# Patient Record
Sex: FEMALE | Race: WHITE | NOT HISPANIC OR LATINO | URBAN - METROPOLITAN AREA
[De-identification: names, ages, dates, MRNs, and addresses within clinical notes are randomized per-mention and may not be internally consistent; named-entity substitution may affect disease eponyms.]

---

## 2018-06-18 ENCOUNTER — TESTING ONLY (OUTPATIENT)
Dept: URBAN - METROPOLITAN AREA CLINIC 45 | Facility: CLINIC | Age: 58
End: 2018-06-18
Payer: COMMERCIAL

## 2018-06-18 PROCEDURE — 92083 EXTENDED VISUAL FIELD XM: CPT | Performed by: OPHTHALMOLOGY

## 2018-06-19 ENCOUNTER — OFFICE VISIT (OUTPATIENT)
Dept: URBAN - METROPOLITAN AREA CLINIC 45 | Facility: CLINIC | Age: 58
End: 2018-06-19
Payer: COMMERCIAL

## 2018-06-19 PROCEDURE — 99214 OFFICE O/P EST MOD 30 MIN: CPT | Performed by: OPHTHALMOLOGY

## 2018-06-19 PROCEDURE — 92133 CPTRZD OPH DX IMG PST SGM ON: CPT | Performed by: OPHTHALMOLOGY

## 2018-06-19 ASSESSMENT — INTRAOCULAR PRESSURE
OS: 18
OD: 19

## 2018-06-19 NOTE — IMPRESSION/PLAN
Impression: Primary open-angle glaucoma, bilateral, indeterminate stage: H40.1134.
5/18 OCT 78/82 6/6 Plan: IOP doing well. Discussed glaucoma and risk of vision loss without treatment and close follow-up. Discussed risks/benefits/alternatives to treatment. OCT RNFL and HVF ordered and reviewed with pt. Continue Latanoprost QHS OU. [id number] [address] [place] for tech to check IOP and dilate prior to seeing.

## 2019-01-14 ENCOUNTER — OFFICE VISIT (OUTPATIENT)
Dept: URBAN - METROPOLITAN AREA CLINIC 45 | Facility: CLINIC | Age: 59
End: 2019-01-14
Payer: COMMERCIAL

## 2019-01-14 PROCEDURE — 92014 COMPRE OPH EXAM EST PT 1/>: CPT | Performed by: OPTOMETRIST

## 2019-01-14 PROCEDURE — 92250 FUNDUS PHOTOGRAPHY W/I&R: CPT | Performed by: OPTOMETRIST

## 2019-01-14 ASSESSMENT — INTRAOCULAR PRESSURE
OS: 20
OD: 20

## 2019-01-14 NOTE — IMPRESSION/PLAN
Impression: Primary open-angle glaucoma, left eye, mild stage: H40.1121. Plan: Latanoprost qhs ou. photos done today.

## 2019-01-14 NOTE — IMPRESSION/PLAN
Impression: Primary open-angle glaucoma, right eye, moderate stage: H40.1112. Plan: Continue with Latanoprost qhs ou.

## 2019-04-22 ENCOUNTER — OFFICE VISIT (OUTPATIENT)
Dept: URBAN - METROPOLITAN AREA CLINIC 45 | Facility: CLINIC | Age: 59
End: 2019-04-22
Payer: COMMERCIAL

## 2019-04-22 DIAGNOSIS — H40.1121 PRIMARY OPEN-ANGLE GLAUCOMA, LEFT EYE, MILD STAGE: ICD-10-CM

## 2019-04-22 DIAGNOSIS — H11.122 CONJUNCTIVAL CONCRETIONS, LEFT EYE: ICD-10-CM

## 2019-04-22 DIAGNOSIS — H04.123 DRY EYE SYNDROME OF BILATERAL LACRIMAL GLANDS: ICD-10-CM

## 2019-04-22 PROCEDURE — 99213 OFFICE O/P EST LOW 20 MIN: CPT | Performed by: OPTOMETRIST

## 2019-04-22 RX ORDER — LATANOPROST 50 UG/ML
0.005 % SOLUTION OPHTHALMIC
Qty: 3 | Refills: 5 | Status: INACTIVE
Start: 2019-04-22 | End: 2021-03-02

## 2019-04-22 ASSESSMENT — INTRAOCULAR PRESSURE
OD: 17
OS: 16

## 2019-04-22 NOTE — IMPRESSION/PLAN
Impression: Primary open-angle glaucoma, right eye, moderate stage: H40.1112.  Plan: Continue with Latanoprost qhs ou

## 2019-04-22 NOTE — IMPRESSION/PLAN
Impression: Conjunctival concretions, left eye: H11.122. Plan: Reassured patient of current condition and treatment.

## 2019-07-19 ENCOUNTER — OFFICE VISIT (OUTPATIENT)
Dept: URBAN - METROPOLITAN AREA CLINIC 45 | Facility: CLINIC | Age: 59
End: 2019-07-19
Payer: COMMERCIAL

## 2019-07-19 PROCEDURE — 92133 CPTRZD OPH DX IMG PST SGM ON: CPT | Performed by: OPTOMETRIST

## 2019-07-19 PROCEDURE — 99213 OFFICE O/P EST LOW 20 MIN: CPT | Performed by: OPTOMETRIST

## 2019-07-19 PROCEDURE — 92083 EXTENDED VISUAL FIELD XM: CPT | Performed by: OPTOMETRIST

## 2019-07-19 ASSESSMENT — KERATOMETRY
OS: 44.63
OD: 44.13

## 2019-07-19 ASSESSMENT — INTRAOCULAR PRESSURE
OD: 18
OS: 16

## 2019-07-19 NOTE — IMPRESSION/PLAN
Impression: Primary open-angle glaucoma, right eye, moderate stage: H40.1112. Plan: Latanoprost qhs ou. Oct shows moderate thinning, stable. VF within normal limits, stable.

## 2019-11-15 ENCOUNTER — OFFICE VISIT (OUTPATIENT)
Dept: URBAN - METROPOLITAN AREA CLINIC 45 | Facility: CLINIC | Age: 59
End: 2019-11-15
Payer: COMMERCIAL

## 2019-11-15 PROCEDURE — 99213 OFFICE O/P EST LOW 20 MIN: CPT | Performed by: OPTOMETRIST

## 2019-11-15 ASSESSMENT — INTRAOCULAR PRESSURE
OD: 18
OS: 18

## 2020-02-13 ENCOUNTER — OFFICE VISIT (OUTPATIENT)
Dept: URBAN - METROPOLITAN AREA CLINIC 40 | Facility: CLINIC | Age: 60
End: 2020-02-13
Payer: COMMERCIAL

## 2020-02-13 DIAGNOSIS — H40.1134 PRIMARY OPEN-ANGLE GLAUCOMA, BILATERAL, INDETERMINATE STAGE: Primary | ICD-10-CM

## 2020-02-13 PROCEDURE — 92133 CPTRZD OPH DX IMG PST SGM ON: CPT | Performed by: OPTOMETRIST

## 2020-02-13 PROCEDURE — 92014 COMPRE OPH EXAM EST PT 1/>: CPT | Performed by: OPTOMETRIST

## 2020-02-13 ASSESSMENT — KERATOMETRY
OS: 44.13
OD: 44.25

## 2020-02-13 ASSESSMENT — INTRAOCULAR PRESSURE
OS: 16
OD: 17

## 2020-02-13 NOTE — IMPRESSION/PLAN
Impression: Primary open-angle glaucoma, bilateral, indeterminate stage: H40.1134. Plan: Intraocular pressure well controlled, tolerating medications. Will continue with same regimen.

## 2020-06-11 ENCOUNTER — OFFICE VISIT (OUTPATIENT)
Dept: URBAN - METROPOLITAN AREA CLINIC 40 | Facility: CLINIC | Age: 60
End: 2020-06-11
Payer: COMMERCIAL

## 2020-06-11 PROCEDURE — 92012 INTRM OPH EXAM EST PATIENT: CPT | Performed by: OPTOMETRIST

## 2020-06-11 ASSESSMENT — INTRAOCULAR PRESSURE
OD: 18
OS: 15

## 2020-07-10 ENCOUNTER — OFFICE VISIT (OUTPATIENT)
Dept: URBAN - METROPOLITAN AREA CLINIC 40 | Facility: CLINIC | Age: 60
End: 2020-07-10
Payer: COMMERCIAL

## 2020-07-10 PROCEDURE — 92014 COMPRE OPH EXAM EST PT 1/>: CPT | Performed by: OPTOMETRIST

## 2020-07-10 PROCEDURE — 92310 CONTACT LENS FITTING OU: CPT | Performed by: OPTOMETRIST

## 2020-07-10 ASSESSMENT — VISUAL ACUITY
OS: 20/20
OD: 20/20

## 2020-11-12 ENCOUNTER — OFFICE VISIT (OUTPATIENT)
Dept: URBAN - METROPOLITAN AREA CLINIC 40 | Facility: CLINIC | Age: 60
End: 2020-11-12
Payer: COMMERCIAL

## 2020-11-12 DIAGNOSIS — H40.1112 PRIMARY OPEN-ANGLE GLAUCOMA, RIGHT EYE, MODERATE STAGE: Primary | ICD-10-CM

## 2020-11-12 PROCEDURE — 92012 INTRM OPH EXAM EST PATIENT: CPT | Performed by: OPTOMETRIST

## 2020-11-12 ASSESSMENT — INTRAOCULAR PRESSURE
OS: 12
OD: 17

## 2021-03-02 ENCOUNTER — OFFICE VISIT (OUTPATIENT)
Dept: URBAN - METROPOLITAN AREA CLINIC 40 | Facility: CLINIC | Age: 61
End: 2021-03-02
Payer: COMMERCIAL

## 2021-03-02 PROCEDURE — 99213 OFFICE O/P EST LOW 20 MIN: CPT | Performed by: OPTOMETRIST

## 2021-03-02 RX ORDER — LATANOPROST 50 UG/ML
0.005 % SOLUTION OPHTHALMIC
Qty: 15 | Refills: 3 | Status: INACTIVE
Start: 2021-03-02 | End: 2022-03-04

## 2021-03-02 ASSESSMENT — INTRAOCULAR PRESSURE
OS: 17
OD: 17

## 2021-07-06 ENCOUNTER — OFFICE VISIT (OUTPATIENT)
Dept: URBAN - METROPOLITAN AREA CLINIC 40 | Facility: CLINIC | Age: 61
End: 2021-07-06
Payer: COMMERCIAL

## 2021-07-06 DIAGNOSIS — H25.13 AGE-RELATED NUCLEAR CATARACT, BILATERAL: ICD-10-CM

## 2021-07-06 PROCEDURE — 99214 OFFICE O/P EST MOD 30 MIN: CPT | Performed by: OPTOMETRIST

## 2021-07-06 ASSESSMENT — KERATOMETRY
OS: 44.38
OD: 44.25

## 2021-07-06 ASSESSMENT — INTRAOCULAR PRESSURE
OD: 18
OS: 16

## 2021-11-02 ENCOUNTER — OFFICE VISIT (OUTPATIENT)
Dept: URBAN - METROPOLITAN AREA CLINIC 40 | Facility: CLINIC | Age: 61
End: 2021-11-02
Payer: COMMERCIAL

## 2021-11-02 PROCEDURE — 99213 OFFICE O/P EST LOW 20 MIN: CPT | Performed by: OPTOMETRIST

## 2021-11-02 ASSESSMENT — INTRAOCULAR PRESSURE
OD: 16
OS: 16

## 2021-11-02 NOTE — IMPRESSION/PLAN
Impression: Primary open-angle glaucoma, right eye, moderate stage: H40.1112. Plan: Intraocular pressure well controlled, tolerating medications. Will continue with same regimen.  Schedule vision exam.

## 2021-11-05 ENCOUNTER — OFFICE VISIT (OUTPATIENT)
Dept: URBAN - METROPOLITAN AREA CLINIC 40 | Facility: CLINIC | Age: 61
End: 2021-11-05
Payer: COMMERCIAL

## 2021-11-05 DIAGNOSIS — H52.4 PRESBYOPIA: Primary | ICD-10-CM

## 2021-11-05 PROCEDURE — 92014 COMPRE OPH EXAM EST PT 1/>: CPT | Performed by: OPTOMETRIST

## 2021-11-05 PROCEDURE — 92310 CONTACT LENS FITTING OU: CPT | Performed by: OPTOMETRIST

## 2021-11-05 ASSESSMENT — VISUAL ACUITY
OS: 20/20
OD: 20/25

## 2021-11-05 ASSESSMENT — KERATOMETRY
OS: 44.13
OD: 44.13

## 2022-03-04 ENCOUNTER — OFFICE VISIT (OUTPATIENT)
Dept: URBAN - METROPOLITAN AREA CLINIC 40 | Facility: CLINIC | Age: 62
End: 2022-03-04
Payer: COMMERCIAL

## 2022-03-04 PROCEDURE — 99213 OFFICE O/P EST LOW 20 MIN: CPT | Performed by: OPTOMETRIST

## 2022-03-04 RX ORDER — LATANOPROST 50 UG/ML
0.005 % SOLUTION OPHTHALMIC
Qty: 15 | Refills: 3 | Status: ACTIVE
Start: 2022-03-04

## 2022-03-04 ASSESSMENT — INTRAOCULAR PRESSURE
OD: 16
OS: 16

## 2022-09-02 ENCOUNTER — OFFICE VISIT (OUTPATIENT)
Dept: URBAN - METROPOLITAN AREA CLINIC 40 | Facility: CLINIC | Age: 62
End: 2022-09-02
Payer: COMMERCIAL

## 2022-09-02 DIAGNOSIS — H40.1112 PRIMARY OPEN-ANGLE GLAUCOMA, RIGHT EYE, MODERATE STAGE: Primary | ICD-10-CM

## 2022-09-02 PROCEDURE — 99213 OFFICE O/P EST LOW 20 MIN: CPT | Performed by: OPTOMETRIST

## 2022-09-02 PROCEDURE — 92133 CPTRZD OPH DX IMG PST SGM ON: CPT | Performed by: OPTOMETRIST

## 2022-09-02 ASSESSMENT — INTRAOCULAR PRESSURE
OD: 16
OS: 17

## 2023-01-30 ENCOUNTER — OFFICE VISIT (OUTPATIENT)
Dept: URBAN - METROPOLITAN AREA CLINIC 40 | Facility: CLINIC | Age: 63
End: 2023-01-30
Payer: COMMERCIAL

## 2023-01-30 DIAGNOSIS — E11.9 TYPE 2 DIABETES MELLITUS W/O COMPLICATION: ICD-10-CM

## 2023-01-30 DIAGNOSIS — H40.1112 PRIMARY OPEN-ANGLE GLAUCOMA, RIGHT EYE, MODERATE STAGE: Primary | ICD-10-CM

## 2023-01-30 DIAGNOSIS — H25.13 AGE-RELATED NUCLEAR CATARACT, BILATERAL: ICD-10-CM

## 2023-01-30 DIAGNOSIS — H40.1121 PRIMARY OPEN-ANGLE GLAUCOMA, LEFT EYE, MILD STAGE: ICD-10-CM

## 2023-01-30 PROCEDURE — 99214 OFFICE O/P EST MOD 30 MIN: CPT | Performed by: OPTOMETRIST

## 2023-01-30 PROCEDURE — 92083 EXTENDED VISUAL FIELD XM: CPT | Performed by: OPTOMETRIST

## 2023-01-30 RX ORDER — METFORMIN HYDROCHLORIDE 500 MG/1
500 MG TABLET, FILM COATED ORAL
Qty: 0 | Refills: 0 | Status: ACTIVE
Start: 2023-01-30

## 2023-01-30 ASSESSMENT — INTRAOCULAR PRESSURE
OD: 14
OS: 13

## 2023-01-30 ASSESSMENT — KERATOMETRY
OD: 44.25
OS: 44.38

## 2023-01-30 NOTE — IMPRESSION/PLAN
Impression: Type 2 diabetes mellitus w/o complication: O43.7. Plan: Diabetes type II: no background retinopathy, no signs of neovascularization or macular edema noted. Discussed ocular and systemic benefits of blood sugar control. Schedule refraction.

## 2023-05-15 ENCOUNTER — OFFICE VISIT (OUTPATIENT)
Dept: URBAN - METROPOLITAN AREA CLINIC 40 | Facility: CLINIC | Age: 63
End: 2023-05-15
Payer: COMMERCIAL

## 2023-05-15 DIAGNOSIS — H40.1112 PRIMARY OPEN-ANGLE GLAUCOMA, RIGHT EYE, MODERATE STAGE: Primary | ICD-10-CM

## 2023-05-15 PROCEDURE — 99213 OFFICE O/P EST LOW 20 MIN: CPT | Performed by: OPTOMETRIST

## 2023-05-15 ASSESSMENT — INTRAOCULAR PRESSURE
OS: 16
OD: 16

## 2023-09-18 ENCOUNTER — OFFICE VISIT (OUTPATIENT)
Dept: URBAN - METROPOLITAN AREA CLINIC 40 | Facility: CLINIC | Age: 63
End: 2023-09-18
Payer: COMMERCIAL

## 2023-09-18 DIAGNOSIS — H40.1112 PRIMARY OPEN-ANGLE GLAUCOMA, RIGHT EYE, MODERATE STAGE: Primary | ICD-10-CM

## 2023-09-18 PROCEDURE — 99213 OFFICE O/P EST LOW 20 MIN: CPT | Performed by: OPTOMETRIST

## 2023-09-18 ASSESSMENT — INTRAOCULAR PRESSURE
OD: 17
OS: 16

## 2024-02-19 ENCOUNTER — OFFICE VISIT (OUTPATIENT)
Dept: URBAN - METROPOLITAN AREA CLINIC 40 | Facility: CLINIC | Age: 64
End: 2024-02-19
Payer: COMMERCIAL

## 2024-02-19 DIAGNOSIS — E11.9 TYPE 2 DIABETES MELLITUS WITHOUT COMPLICATIONS: Primary | ICD-10-CM

## 2024-02-19 DIAGNOSIS — H40.1131 PRIMARY OPEN-ANGLE GLAUCOMA, MILD STAGE, BILATERAL: ICD-10-CM

## 2024-02-19 PROCEDURE — 99214 OFFICE O/P EST MOD 30 MIN: CPT | Performed by: OPTOMETRIST

## 2024-02-19 RX ORDER — LATANOPROST 50 UG/ML
0.005 % SOLUTION OPHTHALMIC
Qty: 15 | Refills: 3 | Status: ACTIVE
Start: 2024-02-19

## 2024-02-19 ASSESSMENT — KERATOMETRY
OD: 44.00
OS: 44.00

## 2024-02-19 ASSESSMENT — INTRAOCULAR PRESSURE
OS: 18
OD: 18

## 2024-07-17 ENCOUNTER — OFFICE VISIT (OUTPATIENT)
Dept: URBAN - METROPOLITAN AREA CLINIC 40 | Facility: CLINIC | Age: 64
End: 2024-07-17
Payer: COMMERCIAL

## 2024-07-17 DIAGNOSIS — H40.1131 PRIMARY OPEN-ANGLE GLAUCOMA, MILD STAGE, BILATERAL: Primary | ICD-10-CM

## 2024-07-17 PROCEDURE — 99213 OFFICE O/P EST LOW 20 MIN: CPT | Performed by: OPTOMETRIST

## 2024-07-17 ASSESSMENT — INTRAOCULAR PRESSURE
OD: 18
OS: 18

## 2024-07-26 ENCOUNTER — OFFICE VISIT (OUTPATIENT)
Dept: URBAN - METROPOLITAN AREA CLINIC 40 | Facility: CLINIC | Age: 64
End: 2024-07-26
Payer: COMMERCIAL

## 2024-07-26 DIAGNOSIS — H52.4 PRESBYOPIA: Primary | ICD-10-CM

## 2024-07-26 PROCEDURE — 92310 CONTACT LENS FITTING OU: CPT | Performed by: OPTOMETRIST

## 2024-07-26 PROCEDURE — 92014 COMPRE OPH EXAM EST PT 1/>: CPT | Performed by: OPTOMETRIST

## 2024-07-26 ASSESSMENT — VISUAL ACUITY
OS: 20/20
OD: 20/20

## 2024-07-26 ASSESSMENT — KERATOMETRY
OD: 44.13
OS: 44.25

## 2024-12-05 ENCOUNTER — OFFICE VISIT (OUTPATIENT)
Dept: URBAN - METROPOLITAN AREA CLINIC 40 | Facility: CLINIC | Age: 64
End: 2024-12-05
Payer: COMMERCIAL

## 2024-12-05 DIAGNOSIS — H40.1131 PRIMARY OPEN-ANGLE GLAUCOMA, MILD STAGE, BILATERAL: Primary | ICD-10-CM

## 2024-12-05 PROCEDURE — 99213 OFFICE O/P EST LOW 20 MIN: CPT | Performed by: OPTOMETRIST

## 2024-12-05 ASSESSMENT — INTRAOCULAR PRESSURE
OS: 18
OD: 18

## 2025-04-07 ENCOUNTER — OFFICE VISIT (OUTPATIENT)
Dept: URBAN - METROPOLITAN AREA CLINIC 40 | Facility: CLINIC | Age: 65
End: 2025-04-07
Payer: COMMERCIAL

## 2025-04-07 DIAGNOSIS — H25.13 AGE-RELATED NUCLEAR CATARACT, BILATERAL: ICD-10-CM

## 2025-04-07 DIAGNOSIS — H40.1131 PRIMARY OPEN-ANGLE GLAUCOMA, BILATERAL, MILD STAGE: Primary | ICD-10-CM

## 2025-04-07 DIAGNOSIS — E11.9 TYPE 2 DIABETES MELLITUS WITHOUT COMPLICATIONS: ICD-10-CM

## 2025-04-07 PROCEDURE — 92083 EXTENDED VISUAL FIELD XM: CPT | Performed by: OPTOMETRIST

## 2025-04-07 PROCEDURE — 76514 ECHO EXAM OF EYE THICKNESS: CPT | Performed by: OPTOMETRIST

## 2025-04-07 PROCEDURE — 92133 CPTRZD OPH DX IMG PST SGM ON: CPT | Performed by: OPTOMETRIST

## 2025-04-07 PROCEDURE — 99214 OFFICE O/P EST MOD 30 MIN: CPT | Performed by: OPTOMETRIST

## 2025-04-07 ASSESSMENT — INTRAOCULAR PRESSURE
OD: 21
OS: 20

## 2025-04-07 ASSESSMENT — KERATOMETRY
OS: 44.25
OD: 44.25

## 2025-05-05 ENCOUNTER — OFFICE VISIT (OUTPATIENT)
Dept: URBAN - METROPOLITAN AREA CLINIC 40 | Facility: CLINIC | Age: 65
End: 2025-05-05
Payer: COMMERCIAL

## 2025-05-05 DIAGNOSIS — H52.4 PRESBYOPIA: Primary | ICD-10-CM

## 2025-05-05 PROCEDURE — 92014 COMPRE OPH EXAM EST PT 1/>: CPT | Performed by: OPTOMETRIST

## 2025-05-05 ASSESSMENT — INTRAOCULAR PRESSURE
OD: 20
OS: 20

## 2025-05-05 ASSESSMENT — KERATOMETRY
OS: 44.13
OD: 44.38

## 2025-05-05 ASSESSMENT — VISUAL ACUITY
OS: 20/20
OD: 20/20

## 2025-08-01 ENCOUNTER — OFFICE VISIT (OUTPATIENT)
Dept: URBAN - METROPOLITAN AREA CLINIC 40 | Facility: CLINIC | Age: 65
End: 2025-08-01
Payer: COMMERCIAL

## 2025-08-01 DIAGNOSIS — H40.1131 PRIMARY OPEN-ANGLE GLAUCOMA, BILATERAL, MILD STAGE: Primary | ICD-10-CM

## 2025-08-01 PROCEDURE — 99213 OFFICE O/P EST LOW 20 MIN: CPT | Performed by: OPTOMETRIST

## 2025-08-01 ASSESSMENT — INTRAOCULAR PRESSURE
OD: 16
OS: 16